# Patient Record
Sex: MALE | Race: WHITE | Employment: FULL TIME | URBAN - METROPOLITAN AREA
[De-identification: names, ages, dates, MRNs, and addresses within clinical notes are randomized per-mention and may not be internally consistent; named-entity substitution may affect disease eponyms.]

---

## 2021-05-26 ENCOUNTER — APPOINTMENT (OUTPATIENT)
Dept: GENERAL RADIOLOGY | Age: 26
End: 2021-05-26

## 2021-05-26 ENCOUNTER — HOSPITAL ENCOUNTER (EMERGENCY)
Age: 26
Discharge: HOME | End: 2021-05-26

## 2021-05-26 VITALS
HEIGHT: 75 IN | RESPIRATION RATE: 16 BRPM | HEART RATE: 100 BPM | BODY MASS INDEX: 19.27 KG/M2 | TEMPERATURE: 98.2 F | SYSTOLIC BLOOD PRESSURE: 126 MMHG | DIASTOLIC BLOOD PRESSURE: 82 MMHG | WEIGHT: 155 LBS | OXYGEN SATURATION: 99 %

## 2021-05-26 DIAGNOSIS — S39.012A LUMBOSACRAL STRAIN, INITIAL ENCOUNTER: Primary | ICD-10-CM

## 2021-05-26 LAB
BILIRUBIN URINE: NEGATIVE
BLOOD, URINE: NEGATIVE
CLARITY: CLEAR
COLOR: YELLOW
GLUCOSE URINE: NEGATIVE MG/DL
KETONES, URINE: NEGATIVE MG/DL
LEUKOCYTE ESTERASE, URINE: NEGATIVE
NITRITE, URINE: NEGATIVE
PH UA: 7 (ref 5–9)
PROTEIN UA: NEGATIVE MG/DL
SPECIFIC GRAVITY UA: 1.01 (ref 1–1.03)
UROBILINOGEN, URINE: 0.2 E.U./DL

## 2021-05-26 PROCEDURE — 6360000002 HC RX W HCPCS: Performed by: NURSE PRACTITIONER

## 2021-05-26 PROCEDURE — 72100 X-RAY EXAM L-S SPINE 2/3 VWS: CPT

## 2021-05-26 PROCEDURE — 96372 THER/PROPH/DIAG INJ SC/IM: CPT

## 2021-05-26 PROCEDURE — 99283 EMERGENCY DEPT VISIT LOW MDM: CPT

## 2021-05-26 PROCEDURE — 81003 URINALYSIS AUTO W/O SCOPE: CPT

## 2021-05-26 RX ORDER — KETOROLAC TROMETHAMINE 30 MG/ML
30 INJECTION, SOLUTION INTRAMUSCULAR; INTRAVENOUS ONCE
Status: COMPLETED | OUTPATIENT
Start: 2021-05-26 | End: 2021-05-26

## 2021-05-26 RX ORDER — CYCLOBENZAPRINE HCL 5 MG
5 TABLET ORAL 2 TIMES DAILY PRN
Qty: 10 TABLET | Refills: 0 | Status: SHIPPED | OUTPATIENT
Start: 2021-05-26 | End: 2021-06-05

## 2021-05-26 RX ORDER — IBUPROFEN 600 MG/1
600 TABLET ORAL EVERY 6 HOURS PRN
Qty: 20 TABLET | Refills: 0 | Status: SHIPPED | OUTPATIENT
Start: 2021-05-26

## 2021-05-26 RX ORDER — ORPHENADRINE CITRATE 30 MG/ML
60 INJECTION INTRAMUSCULAR; INTRAVENOUS ONCE
Status: COMPLETED | OUTPATIENT
Start: 2021-05-26 | End: 2021-05-26

## 2021-05-26 RX ORDER — DEXAMETHASONE SODIUM PHOSPHATE 10 MG/ML
8 INJECTION, SOLUTION INTRAMUSCULAR; INTRAVENOUS ONCE
Status: COMPLETED | OUTPATIENT
Start: 2021-05-26 | End: 2021-05-26

## 2021-05-26 RX ORDER — METHYLPREDNISOLONE 4 MG/1
TABLET ORAL
Qty: 1 KIT | Refills: 0 | Status: SHIPPED | OUTPATIENT
Start: 2021-05-26 | End: 2021-06-01

## 2021-05-26 RX ADMIN — DEXAMETHASONE SODIUM PHOSPHATE 8 MG: 10 INJECTION, SOLUTION INTRAMUSCULAR; INTRAVENOUS at 17:05

## 2021-05-26 RX ADMIN — ORPHENADRINE CITRATE 60 MG: 60 INJECTION INTRAMUSCULAR; INTRAVENOUS at 17:05

## 2021-05-26 RX ADMIN — KETOROLAC TROMETHAMINE 30 MG: 30 INJECTION, SOLUTION INTRAMUSCULAR at 17:05

## 2021-05-26 ASSESSMENT — PAIN SCALES - GENERAL: PAINLEVEL_OUTOF10: 3

## 2021-05-26 ASSESSMENT — PAIN DESCRIPTION - PROGRESSION: CLINICAL_PROGRESSION: GRADUALLY IMPROVING

## 2021-05-27 NOTE — ED PROVIDER NOTES
[facility]  Department of Emergency Medicine   ED  Encounter Note  Admit Date/RoomTime: 2021  4:11 PM  ED Room:     NAME: Bertha Fregoso  : 1995  MRN: [id number]     Chief Complaint:  Back Pain (Started Mare Louie, stretching seems to help a little. )    History of Present Illness       Bertha Fregoso is a 30 y.o. old male with a prior history of no prior back problems, presents to the emergency department by private vehicle, for traumatic acute, aching and cramping bilateral lower lumbar spine pain without radiation, for several day(s) prior to arrival.  There has been no recent injury as it relates to today's visit. Since onset the symptoms have been waxing and waning and is mild in severity. He has associated signs & symptoms of nothing additional.   He denies any bladder or bowel incontinence , new weakness, tingling or paresthesias, recent back injection, recent spinal surgery, recent spinal/chiropractic manipulation, history of IVDA, fever, abdominal pain, bladder incontinence, bowel incontinence, bladder retention, bladder urgency, bowel urgency, saddle paresthesias  or sacral numbness. The pain is aggraveated by nothing in particular and relieved by nothing despite medication use and rest.  He has never been enrolled in a pain management program.  Patient states that he started working a new position and has been lifting and twisting more than normal.  .  .  ROS   Pertinent positives and negatives are stated within HPI, all other systems reviewed and are negative. Past Medical History:  has a past medical history of Asthma, Gastritis, and Right bundle branch block. Surgical History:  has a past surgical history that includes Tonsillectomy. Social History:  reports that he has been smoking cigarettes. He has a 3.00 pack-year smoking history.  He has never used smokeless tobacco. He reports current alcohol use of about 12.0 standard drinks of alcohol per week. He reports previous drug use. Drug: Marijuana. Family History: family history includes Alcohol Abuse in his paternal grandfather; Allergies in his father, maternal uncle, mother, and paternal uncle; Anemia in an other family member; Cancer (age of onset: 35) in his maternal grandfather; Cirrhosis in his paternal grandfather; Dementia in his paternal grandfather; Depression in his father; Diabetes in his paternal grandfather; Hearing Loss in his father; Heart Surgery in his maternal aunt; High Blood Pressure in his maternal uncle and paternal grandfather; High Cholesterol in his maternal grandmother, maternal uncle, and paternal grandfather; Stroke in his paternal grandfather; Thyroid Disease in his maternal grandmother and another family member. Allergies: Amoxicillin    Physical Exam   Oxygen Saturation Interpretation: Normal.        ED Triage Vitals   BP Temp Temp Source Pulse Resp SpO2 Height Weight   05/26/21 1556 05/26/21 1556 05/26/21 1556 05/26/21 1556 05/26/21 1556 05/26/21 1556 05/26/21 1600 05/26/21 1600   126/82 98.2 °F (36.8 °C) Temporal 100 16 99 % 6' 3\" (1.905 m) 155 lb (70.3 kg)         Constitutional:  Alert, development consistent with age. HEENT:  NC/NT. Airway patent. Neck:  Normal ROM. Supple. Respiratory:  Clear to auscultation and breath sounds equal.  CV:  Regular rate and rhythm, normal heart sounds, without pathological murmurs, ectopy, gallops, or rubs. GI:  Abdomen Soft, nontender, good bowel sounds. No firm or pulsatile mass. Back: lower lumbar spine bilateral.             Tenderness: None. Swelling: no.              Range of Motion: full range with pain. CVA Tenderness: No.            Straight leg raising:  Bilateral negative. Skin:  no erythema, rash or swelling noted. Distal Function:              Motor deficit: none. Sensory deficit: none. Pulse deficit: none.             Calf I reviewed today's visit with the patient in addition to providing specific details for the plan of care and counseling regarding the diagnosis and prognosis. Questions are answered at this time and are agreeable with the plan. Assessment      1. Lumbosacral strain, initial encounter      Plan   Discharge home. Patient condition is good    New Medications     Discharge Medication List as of 5/26/2021  6:19 PM      START taking these medications    Details   methylPREDNISolone (MEDROL, ADAN,) 4 MG tablet Take by mouth., Disp-1 kit, R-0Normal      cyclobenzaprine (FLEXERIL) 5 MG tablet Take 1 tablet by mouth 2 times daily as needed for Muscle spasms, Disp-10 tablet, R-0Normal           Electronically signed by MELODIE Magdaleno CNP   DD: 5/27/21  **This report was transcribed using voice recognition software. Every effort was made to ensure accuracy; however, inadvertent computerized transcription errors may be present.   END OF ED PROVIDER NOTE        MELODIE Hannah CNP  05/27/21 3570

## 2021-11-09 ENCOUNTER — OFFICE VISIT (OUTPATIENT)
Dept: FAMILY MEDICINE CLINIC | Age: 26
End: 2021-11-09

## 2021-11-09 VITALS
TEMPERATURE: 96.5 F | BODY MASS INDEX: 20.62 KG/M2 | OXYGEN SATURATION: 99 % | DIASTOLIC BLOOD PRESSURE: 60 MMHG | WEIGHT: 165 LBS | HEART RATE: 90 BPM | SYSTOLIC BLOOD PRESSURE: 110 MMHG

## 2021-11-09 DIAGNOSIS — R07.0 PAIN IN THROAT: Primary | ICD-10-CM

## 2021-11-09 DIAGNOSIS — R09.82 POSTNASAL DRIP: ICD-10-CM

## 2021-11-09 DIAGNOSIS — J01.90 ACUTE NON-RECURRENT SINUSITIS, UNSPECIFIED LOCATION: ICD-10-CM

## 2021-11-09 LAB — S PYO AG THROAT QL: NORMAL

## 2021-11-09 PROCEDURE — 99203 OFFICE O/P NEW LOW 30 MIN: CPT | Performed by: PHYSICIAN ASSISTANT

## 2021-11-09 PROCEDURE — 87880 STREP A ASSAY W/OPTIC: CPT | Performed by: PHYSICIAN ASSISTANT

## 2021-11-09 RX ORDER — DOXYCYCLINE HYCLATE 100 MG
100 TABLET ORAL 2 TIMES DAILY
Qty: 20 TABLET | Refills: 0 | Status: SHIPPED | OUTPATIENT
Start: 2021-11-09 | End: 2021-11-19

## 2021-11-09 RX ORDER — PREDNISONE 10 MG/1
TABLET ORAL
Qty: 18 TABLET | Refills: 0 | Status: SHIPPED | OUTPATIENT
Start: 2021-11-09

## 2021-11-09 NOTE — LETTER
[facility]  [address] [place] [address]  Phone: [phone]  Fax: [phone] [place], [place]    November 10, 2021     Lowell Feldman [address]    Patient: August Taylor   MR Number: [id number]   YOB: 1995   Date of Visit: 11/9/2021       Dear Rocio Frederick: Thank you for referring August Taylor to me for evaluation/treatment. Below are the relevant portions of my assessment and plan of care. If you have questions, please do not hesitate to call me. I look forward to following [place] along with you.     Sincerely,      Eli Monge III PA

## 2021-11-09 NOTE — LETTER
[facility]  [address] [place] [address]  Phone: [phone]  Fax: [phone] [place], [place]        November 9, 2021     Patient: [place]   YOB: 1995   Date of Visit: 11/9/2021       To Whom it May Concern:    [place] was seen in my clinic on 11/9/2021. If you have any questions or concerns, please don't hesitate to call.     Sincerely,         Cricket Mcardle III, PA

## 2021-11-09 NOTE — PROGRESS NOTES
21  Gabriel Melchor : 1995 Sex: male  Age 30 y.o. Subjective:  Chief Complaint   Patient presents with    Generalized Body Aches     sx x1d     Pharyngitis         HPI:   Gabriel Melchor , 30 y.o. male presents to [facility] for evaluation of sore throat, congestion, myalgias    HPI  57-year-old male presents to [facility] for evaluation of sore throat, congestion, drainage, myalgias. The patient started with these symptoms over the last 1 to 2 days. The patient denied any fever, chills. The patient had a little bit of a tickle in his throat yesterday and seem to significantly get worse. States that family members are all sick around him. The patient is not currently on any antibiotics. The patient has not had Covid or Covid vaccine. ROS:   Unless otherwise stated in this report the patient's positive and negative responses for review of systems for constitutional, eyes, ENT, cardiovascular, respiratory, gastrointestinal, neurological, , musculoskeletal, and integument systems and related systems to the presenting problem are either stated in the history of present illness or were not pertinent or were negative for the symptoms and/or complaints related to the presenting medical problem. Positives and pertinent negatives as per HPI. All others reviewed and are negative.       PMH:     Past Medical History:   Diagnosis Date    Asthma     well controed    Gastritis     with indigestion    Right bundle branch block     hx SOB; abnormal echo per mother - LILIANA to rule out ASD       Past Surgical History:   Procedure Laterality Date    TONSILLECTOMY         Family History   Problem Relation Age of Onset    Allergies Mother     Depression Father     Allergies Father     Hearing Loss Father     Allergies Maternal Uncle     High Blood Pressure Maternal Uncle     High Cholesterol Maternal Uncle     Allergies Paternal Uncle     Thyroid Disease Maternal Grandmother     High Cholesterol Maternal Grandmother     Cancer Maternal Grandfather 35        Leukemia    Dementia Paternal Grandfather     Diabetes Paternal Grandfather     Alcohol Abuse Paternal Grandfather     Cirrhosis Paternal Grandfather     High Blood Pressure Paternal Grandfather     High Cholesterol Paternal Grandfather     Stroke Paternal Grandfather     Thyroid Disease Other     Anemia Other         pernicious amenia    Heart Surgery Maternal Aunt         valve replacement       Medications:     Current Outpatient Medications:     predniSONE (DELTASONE) 10 MG tablet, 3 tabs once daily for 3 days, 2 tabs once daily for 3 days, 1 tab once daily for 3 days, Disp: 18 tablet, Rfl: 0    doxycycline hyclate (VIBRA-TABS) 100 MG tablet, Take 1 tablet by mouth 2 times daily for 10 days, Disp: 20 tablet, Rfl: 0    ibuprofen (ADVIL;MOTRIN) 600 MG tablet, Take 1 tablet by mouth every 6 hours as needed for Pain, Disp: 20 tablet, Rfl: 0    acetaminophen (TYLENOL) 500 MG tablet, Take 1,000 mg by mouth every 6 hours as needed. Every 8 hours as needed for pain, Disp: , Rfl:     Multiple Vitamins-Minerals (P-D MULTI VIT/MINERAL COMPLETE PO), Take 1 tablet by mouth daily. , Disp: , Rfl:     Cholecalciferol (VITAMIN D) 2000 UNITS CAPS capsule, Take 2,000 Units by mouth daily. , Disp: , Rfl:     loratadine (CLARITIN) 10 MG tablet, Take 10 mg by mouth daily. , Disp: , Rfl:     beclomethasone (QVAR) 80 MCG/ACT inhaler, Inhale 1 puff into the lungs 2 times daily. Instructed to use am of surgery, Disp: , Rfl:     PROVENTIL  (90 BASE) MCG/ACT inhaler, as needed. Instructed mother to bring day of surgery, Disp: , Rfl:     Dentifrices (FLUORIDE TOOTHPASTE DT), , Disp: , Rfl:     Lansoprazole (PREVACID PO), Take  by mouth as needed. Needs with caffeine & spicey food intake, Disp: , Rfl:     Allergies:      Allergies   Allergen Reactions    Amoxicillin Rash       Social History:     Social History     Tobacco Use    Smoking reviewed. Allergies reviewed. Medications reviewed. Patient on arrival does not appear to be in any apparent distress or discomfort. The patient has been seen and evaluated. The patient does not appear to be toxic or lethargic. The patient had a rapid strep that was negative. The patient did not want Covid testing. The patient will be treated with prednisone and doxycycline. The patient was educated on the proper dosage of motrin and tylenol and the appropriate intervals of each. The patient is to increase fluid intake over the next several days. The patient is to use OTC decongestant as needed. The patient is to return to express care or go directly to the emergency department should any of the signs or symptoms worsen. The patient is to followup with primary care physician in 2-3 days for repeat evaluation. The patient has no other questions or concerns at this time the patient will be discharged home. Clinical Impression:   [place] was seen today for generalized body aches and pharyngitis. Diagnoses and all orders for this visit:    Pain in throat  -     POCT rapid strep A    Acute non-recurrent sinusitis, unspecified location    Postnasal drip    Other orders  -     predniSONE (DELTASONE) 10 MG tablet; 3 tabs once daily for 3 days, 2 tabs once daily for 3 days, 1 tab once daily for 3 days  -     doxycycline hyclate (VIBRA-TABS) 100 MG tablet; Take 1 tablet by mouth 2 times daily for 10 days        The patient is to call for any concerns or return if any of the signs or symptoms worsen. The patient is to follow-up with PCP in the next 2-3 days for repeat evaluation repeat assessment or go directly to the emergency department.      SIGNATURE: Paul Mohr III, PA-C

## 2021-11-09 NOTE — Clinical Note
[facility]  [address] [place] [address]  Phone: [phone]  Fax: [phone] [place], [place]        November 9, 2021     Patient: [place]   YOB: 1995   Date of Visit: 11/9/2021       To Whom It May Concern: It is my medical opinion that [place] {Work release (duty restriction):57865}. If you have any questions or concerns, please don't hesitate to call.     Sincerely,        HARMAN Strong III